# Patient Record
Sex: MALE | Race: BLACK OR AFRICAN AMERICAN | NOT HISPANIC OR LATINO | URBAN - METROPOLITAN AREA
[De-identification: names, ages, dates, MRNs, and addresses within clinical notes are randomized per-mention and may not be internally consistent; named-entity substitution may affect disease eponyms.]

---

## 2022-07-28 ENCOUNTER — PATIENT MESSAGE (OUTPATIENT)
Dept: ORTHOPEDICS | Facility: CLINIC | Age: 47
End: 2022-07-28
Payer: COMMERCIAL

## 2022-07-28 DIAGNOSIS — M25.561 RIGHT KNEE PAIN, UNSPECIFIED CHRONICITY: Primary | ICD-10-CM

## 2022-08-03 ENCOUNTER — OFFICE VISIT (OUTPATIENT)
Dept: SPORTS MEDICINE | Facility: CLINIC | Age: 47
End: 2022-08-03
Payer: COMMERCIAL

## 2022-08-03 ENCOUNTER — HOSPITAL ENCOUNTER (OUTPATIENT)
Dept: RADIOLOGY | Facility: HOSPITAL | Age: 47
Discharge: HOME | End: 2022-08-03
Attending: ORTHOPAEDIC SURGERY
Payer: COMMERCIAL

## 2022-08-03 ENCOUNTER — TELEPHONE (OUTPATIENT)
Dept: ORTHOPEDICS | Facility: CLINIC | Age: 47
End: 2022-08-03
Payer: COMMERCIAL

## 2022-08-03 DIAGNOSIS — M25.561 RIGHT KNEE PAIN, UNSPECIFIED CHRONICITY: ICD-10-CM

## 2022-08-03 DIAGNOSIS — M25.561 RIGHT KNEE PAIN, UNSPECIFIED CHRONICITY: Primary | ICD-10-CM

## 2022-08-03 PROCEDURE — 1159F MED LIST DOCD IN RCRD: CPT | Mod: CPTII,S$GLB,, | Performed by: FAMILY MEDICINE

## 2022-08-03 PROCEDURE — 73562 X-RAY EXAM OF KNEE 3: CPT | Mod: TC,LT

## 2022-08-03 PROCEDURE — 99999 PR PBB SHADOW E&M-EST. PATIENT-LVL II: ICD-10-PCS | Mod: PBBFAC,,, | Performed by: FAMILY MEDICINE

## 2022-08-03 PROCEDURE — 73564 X-RAY EXAM KNEE 4 OR MORE: CPT | Mod: 26,RT,, | Performed by: RADIOLOGY

## 2022-08-03 PROCEDURE — 99204 PR OFFICE/OUTPT VISIT, NEW, LEVL IV, 45-59 MIN: ICD-10-PCS | Mod: 25,S$GLB,, | Performed by: FAMILY MEDICINE

## 2022-08-03 PROCEDURE — 20610 LARGE JOINT ASPIRATION/INJECTION: R KNEE: ICD-10-PCS | Mod: RT,S$GLB,, | Performed by: FAMILY MEDICINE

## 2022-08-03 PROCEDURE — 99204 OFFICE O/P NEW MOD 45 MIN: CPT | Mod: 25,S$GLB,, | Performed by: FAMILY MEDICINE

## 2022-08-03 PROCEDURE — 73562 XR KNEE ORTHO RIGHT WITH FLEXION: ICD-10-PCS | Mod: 26,LT,, | Performed by: RADIOLOGY

## 2022-08-03 PROCEDURE — 73564 XR KNEE ORTHO RIGHT WITH FLEXION: ICD-10-PCS | Mod: 26,RT,, | Performed by: RADIOLOGY

## 2022-08-03 PROCEDURE — 73562 X-RAY EXAM OF KNEE 3: CPT | Mod: 26,LT,, | Performed by: RADIOLOGY

## 2022-08-03 PROCEDURE — 99999 PR PBB SHADOW E&M-EST. PATIENT-LVL II: CPT | Mod: PBBFAC,,, | Performed by: FAMILY MEDICINE

## 2022-08-03 PROCEDURE — 20610 DRAIN/INJ JOINT/BURSA W/O US: CPT | Mod: RT,S$GLB,, | Performed by: FAMILY MEDICINE

## 2022-08-03 PROCEDURE — 1159F PR MEDICATION LIST DOCUMENTED IN MEDICAL RECORD: ICD-10-PCS | Mod: CPTII,S$GLB,, | Performed by: FAMILY MEDICINE

## 2022-08-03 RX ORDER — BETAMETHASONE SODIUM PHOSPHATE AND BETAMETHASONE ACETATE 3; 3 MG/ML; MG/ML
6 INJECTION, SUSPENSION INTRA-ARTICULAR; INTRALESIONAL; INTRAMUSCULAR; SOFT TISSUE
Status: DISCONTINUED | OUTPATIENT
Start: 2022-08-03 | End: 2022-08-03 | Stop reason: HOSPADM

## 2022-08-03 RX ADMIN — BETAMETHASONE SODIUM PHOSPHATE AND BETAMETHASONE ACETATE 6 MG: 3; 3 INJECTION, SUSPENSION INTRA-ARTICULAR; INTRALESIONAL; INTRAMUSCULAR; SOFT TISSUE at 10:08

## 2022-08-03 NOTE — PATIENT INSTRUCTIONS
"Apply ice to affected area three times a day for (15) fifteen minutes for the next 48 hours, and reduce activity during that time.  Voltaren gel three times a day to affected area if recommended.  Oral medication if recommended.  Physical therapy if recommended at home or at clinic.  Keep recheck visit. Patient Education       Knee Pain   The Basics   Written by the doctors and editors at [facility]   What causes knee pain? -- Many different conditions can cause knee pain. Some of the most common are listed below.  Bending or using the knee too much - This can cause pain in the front of the knee that worsens with running, climbing steps, or sitting for a long time.  Arthritis - Arthritis is a general term that means inflammation of the joints. There are lots of types of arthritis. The most common type, called osteoarthritis, often comes with age. It can cause pain, stiffness, and swelling (figure 1).  Bursitis - Bursitis happens when fluid-filled sacs around the knee (called "bursae") get irritated or swollen (figure 2). Bursitis can cause pain and swelling.  A collection of fluid in the knee - This can happen after a knee injury.  A tear in the meniscus - The meniscus is a cushion of rubbery material (cartilage) between the thigh bone and the leg bone (figure 3).  A tear in a ligament - Ligaments are bands of tissue that connect one bone to another. There are 4 ligaments in each knee (figure 3).  Muscle strain - Different leg muscles move the knee joint, causing the knee to bend and straighten. If one of these muscles doesn't work well, moving the knee can cause pain.  Other knee injuries, a knee joint infection, or a condition called gout, which causes crystals to form inside joints.  Conditions that don't involve the knee - For example, problems in the hip can sometimes cause knee pain.  Is there anything I can do on my own to feel better? -- Yes. To ease your symptoms, you can:  Put ice on the knee to reduce pain " "and swelling - For the first few weeks after an injury, or after an activity that makes your pain worse, you can try icing your knee. Put a cold gel pack, bag of ice, or bag of frozen vegetables on the injured area every 1 to 2 hours, for 15 minutes each time. Put a thin towel between the ice (or other cold object) and your skin. To reduce swelling, sit or lie down and raise your leg above the level of your heart when you put ice on it.  Rest your knee and avoid movements that worsen the pain - Try not to squat, kneel, or run. Also, don't use exercise machines, such as stair steppers or rowing machines. Instead, you can walk or swim (the front and back crawl strokes) for exercise.  Take a pain-relieving medicine, such as acetaminophen (sample brand name: Tylenol) or ibuprofen (sample brand names: Advil, Motrin).  Should I see a doctor or nurse? -- See your doctor or nurse if:  You are unable to put weight on your knee, your knee "locks" in place, or your knee "gives out"  Your knee is very swollen and painful  You have a fever with knee pain, swelling, and redness  Your knee pain doesn't get better or gets worse after you treat it on your own for a few days  How is knee pain treated? -- The right treatment for knee pain depends on what is causing it. Treatments might include:  Wearing a knee brace or shoe insert  Doing exercises to strengthen and stretch the muscles that move the knee joint - Ask your doctor or nurse which exercises can help with the cause of your pain.  Having physical therapy  Getting a shot of medicine in the knee  Other medicines  Surgery  All topics are updated as new evidence becomes available and our peer review process is complete.  This topic retrieved from [employer] on: Sep 21, 2021.  Topic 97375 Version 11.0  Release: 29.4.2 - C29.263  © 2021 UpToDate, Inc. and/or its affiliates. All rights reserved.  figure 1: Knee osteoarthritis     This drawing shows a normal knee joint next to a knee " "joint with osteoarthritis (OA). In the OA joint, the cartilage covering the ends of the bones roughens and becomes thin, while the bone underneath the cartilage grows thicker. Bony growths called "osteophytes" can form. The space between the bones also becomes narrower.  Graphic 221219 Version 2.0    figure 2: Knee bursa (prepatellar bursa)     Graphic 01326 Version 3.0    figure 3: Front view of the knee     This drawing shows the inner parts of the knee as seen from the front. A small bone (called the patella or the "knee cap") that sits in front of the knee has been removed so that you can see what is under that bone. The anterior cruciate ligament (ACL) is in the middle in white. It connects the thigh bone (called the "femur") to the shin bone (called the "tibia"). The meniscus is a cushion of rubbery material (cartilage) between the thigh bone and the shin bone.  Graphic 17098 Version 5.0    Consumer Information Use and Disclaimer   This information is not specific medical advice and does not replace information you receive from your health care provider. This is only a brief summary of general information. It does NOT include all information about conditions, illnesses, injuries, tests, procedures, treatments, therapies, discharge instructions or life-style choices that may apply to you. You must talk with your health care provider for complete information about your health and treatment options. This information should not be used to decide whether or not to accept your health care provider's advice, instructions or recommendations. Only your health care provider has the knowledge and training to provide advice that is right for you. The use of this information is governed by the [employer] End User License Agreement, available at https://www.CreationFlow.ZeroPercent.us/en/solutions/[employer]/about/alonso.The use of [employer] content is governed by the [employer] Terms of Use. ©2021 [employer] All rights " reserved.  Copyright   © 2021 [employer] and/or its affiliates. All rights reserved.

## 2022-08-03 NOTE — PROCEDURES
Large Joint Aspiration/Injection: R knee    Date/Time: 8/3/2022 10:00 AM  Performed by: Amanuel Feldman MD  Authorized by: Amanuel Feldman MD     Indications:  Pain  Site marked: the procedure site was marked    Timeout: prior to procedure the correct patient, procedure, and site was verified    Prep: patient was prepped and draped in usual sterile fashion    Local anesthetic:  Bupivacaine 0.25% without epinephrine and lidocaine 1% without epinephrine  Approach:  Anterolateral  Location:  Knee  Site:  R knee  Medications:  6 mg betamethasone acetate-betamethasone sodium phosphate 6 mg/mL  Patient tolerance:  Patient tolerated the procedure well with no immediate complications

## 2022-08-03 NOTE — TELEPHONE ENCOUNTER
Called pt to r/s appointment due to provider being out of office. He agreed to same day/ same time visit with Dr. Amanuel Feldman.

## 2022-08-03 NOTE — PROGRESS NOTES
Subjective:     Patient ID: True Subramanian is a 47 y.o. male.    Chief Complaint: Pain of the Right Knee      HPI:  Patient states that he developed right knee pain over the last 3 months without a specific DILLON.  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  He also likes to play basketball and is unable to do this due to pain, weakness and instability in the overall knee.  He states that the pain is all over the knee and currently rates the pain as a 5/10 with the pain increasing to an 8/10 when active.  He is currently taking an anti inflammatory Duexis prescribed by Dr. Radha Boston when he had a previous achilles injury.  He has not tried physical therapy for this injury.    Experiences buckling but no locking.  Discontinue playing basketball the past few months and then tried 1 day last week and did okay until the 2nd day after play and that he had severe pain in the knee and increased swelling.    History reviewed. No pertinent past medical history.  Past Surgical History:   Procedure Laterality Date    ACHILLES TENDON SURGERY Right 2017     History reviewed. No pertinent family history.  Social History     Socioeconomic History    Marital status: Single   Tobacco Use    Smoking status: Passive Smoke Exposure - Never Smoker    Smokeless tobacco: Never Used   Substance and Sexual Activity    Alcohol use: Not Currently    Drug use: Never    Sexual activity: Yes     No current outpatient medications on file.  Review of patient's allergies indicates:  No Known Allergies  Review of Systems   Constitutional: Negative for chills, fever and weight loss.   Respiratory: Negative for shortness of breath.    Cardiovascular: Negative for chest pain and palpitations.       Objective:   There is no height or weight on file to calculate BMI.  There were no vitals filed for this visit.        Ortho/SPM Exam-alert and  "oriented well-nourished well-developed pleasant adult male ambulatory in no acute distress    Respiratory effort is normal    Right knee-no acute deformity    1+ diffuse anterior puffiness    Point tender distal quad medial aspect    Range of motion 5-90 degrees as compared to 0-110 on opposite side    Right ankle dorsiflexion is a few degrees less on the right compared to the left-has or both Achilles in the past    Knee joint is stable with negative Lachman's    Strength 4/5    Psychiatric good affect cognition    Neurovascular intact    Plan as cortisone injection to improve function and decrease pain.  Physical therapy for strengthening and range of motion.    Use ice and Duexis as needed and avoid basketball or climbing or stressful activities for now    Patient Instructions   Apply ice to affected area three times a day for (15) fifteen minutes for the next 48 hours, and reduce activity during that time.  Voltaren gel three times a day to affected area if recommended.  Oral medication if recommended.  Physical therapy if recommended at home or at clinic.  Keep recheck visit. Patient Education       Knee Pain   The Basics   Written by the doctors and editors at [facility]   What causes knee pain? -- Many different conditions can cause knee pain. Some of the most common are listed below.  · Bending or using the knee too much - This can cause pain in the front of the knee that worsens with running, climbing steps, or sitting for a long time.  · Arthritis - Arthritis is a general term that means inflammation of the joints. There are lots of types of arthritis. The most common type, called osteoarthritis, often comes with age. It can cause pain, stiffness, and swelling (figure 1).  · Bursitis - Bursitis happens when fluid-filled sacs around the knee (called "bursae") get irritated or swollen (figure 2). Bursitis can cause pain and swelling.  · A collection of fluid in the knee - This can happen after a knee " "injury.  · A tear in the meniscus - The meniscus is a cushion of rubbery material (cartilage) between the thigh bone and the leg bone (figure 3).  · A tear in a ligament - Ligaments are bands of tissue that connect one bone to another. There are 4 ligaments in each knee (figure 3).  · Muscle strain - Different leg muscles move the knee joint, causing the knee to bend and straighten. If one of these muscles doesn't work well, moving the knee can cause pain.  · Other knee injuries, a knee joint infection, or a condition called gout, which causes crystals to form inside joints.  · Conditions that don't involve the knee - For example, problems in the hip can sometimes cause knee pain.  Is there anything I can do on my own to feel better? -- Yes. To ease your symptoms, you can:  · Put ice on the knee to reduce pain and swelling - For the first few weeks after an injury, or after an activity that makes your pain worse, you can try icing your knee. Put a cold gel pack, bag of ice, or bag of frozen vegetables on the injured area every 1 to 2 hours, for 15 minutes each time. Put a thin towel between the ice (or other cold object) and your skin. To reduce swelling, sit or lie down and raise your leg above the level of your heart when you put ice on it.  · Rest your knee and avoid movements that worsen the pain - Try not to squat, kneel, or run. Also, don't use exercise machines, such as stair steppers or rowing machines. Instead, you can walk or swim (the front and back crawl strokes) for exercise.  · Take a pain-relieving medicine, such as acetaminophen (sample brand name: Tylenol) or ibuprofen (sample brand names: Advil, Motrin).  Should I see a doctor or nurse? -- See your doctor or nurse if:  · You are unable to put weight on your knee, your knee "locks" in place, or your knee "gives out"  · Your knee is very swollen and painful  · You have a fever with knee pain, swelling, and redness  · Your knee pain doesn't get better " "or gets worse after you treat it on your own for a few days  How is knee pain treated? -- The right treatment for knee pain depends on what is causing it. Treatments might include:  · Wearing a knee brace or shoe insert  · Doing exercises to strengthen and stretch the muscles that move the knee joint - Ask your doctor or nurse which exercises can help with the cause of your pain.  · Having physical therapy  · Getting a shot of medicine in the knee  · Other medicines  · Surgery  All topics are updated as new evidence becomes available and our peer review process is complete.  This topic retrieved from [employer] on: Sep 21, 2021.  Topic 99811 Version 11.0  Release: 29.4.2 - C29.263  © 2021 UpToDate, Inc. and/or its affiliates. All rights reserved.  figure 1: Knee osteoarthritis     This drawing shows a normal knee joint next to a knee joint with osteoarthritis (OA). In the OA joint, the cartilage covering the ends of the bones roughens and becomes thin, while the bone underneath the cartilage grows thicker. Bony growths called "osteophytes" can form. The space between the bones also becomes narrower.  Graphic 703768 Version 2.0    figure 2: Knee bursa (prepatellar bursa)     Graphic 26545 Version 3.0    figure 3: Front view of the knee     This drawing shows the inner parts of the knee as seen from the front. A small bone (called the patella or the "knee cap") that sits in front of the knee has been removed so that you can see what is under that bone. The anterior cruciate ligament (ACL) is in the middle in white. It connects the thigh bone (called the "femur") to the shin bone (called the "tibia"). The meniscus is a cushion of rubbery material (cartilage) between the thigh bone and the shin bone.  Graphic 89511 Version 5.0    Consumer Information Use and Disclaimer   This information is not specific medical advice and does not replace information you receive from your health care provider. This is only a brief summary " of general information. It does NOT include all information about conditions, illnesses, injuries, tests, procedures, treatments, therapies, discharge instructions or life-style choices that may apply to you. You must talk with your health care provider for complete information about your health and treatment options. This information should not be used to decide whether or not to accept your health care provider's advice, instructions or recommendations. Only your health care provider has the knowledge and training to provide advice that is right for you. The use of this information is governed by the [employer] End User License Agreement, available at https://www.[employer]iptr/en/solutions/[employer]/about/alonso.The use of [employer] content is governed by the [employer] Terms of Use. ©2021 UpToDate, Inc. All rights reserved.  Copyright   © 2021 UpToDate, Inc. and/or its affiliates. All rights reserved.        IMAGING: X-ray Knee Ortho Right with Flexion  Narrative: EXAMINATION:  XR KNEE ORTHO RIGHT WITH FLEXION    CLINICAL HISTORY:  Pain in right knee    TECHNIQUE:  AP standing as well as PA flexion standing and Merchant views of both knees were performed.  A lateral view of the right knee is also performed.    COMPARISON:  None.    FINDINGS:  No acute fracture or dislocation.  No significant joint space narrowing.  No patellar tilt.  Soft tissues are within normal limits  Impression: See above    Electronically signed by: Boone Saavedra MD  Date:    08/03/2022  Time:    10:46       Radiographs / Imaging : Relevant imaging results reviewed by me and interpreted by me, discussed with the patient and / or family -x-rays reviewed by me and agree with report and discussed in viewed detail with the patient today    Assessment:     Encounter Diagnosis   Name Primary?    Right knee pain, unspecified chronicity Yes        Plan:   Right knee pain, unspecified chronicity  -     Large Joint Aspiration/Injection: R  knee        The patient verbalized good understanding of the medical issues discussed today and expressed appreciation for the care provided.  Patient was given the opportunity to ask questions and be an active participant in their medical care. Patient had no further questions or concerns at this time.     The patient was encouraged to participate in appropriate physical activity.      Amanuel Feldman M.D.  [facility]        This note was dictated using voice recognition software and may have sound a like errors.

## 2022-09-21 ENCOUNTER — OFFICE VISIT (OUTPATIENT)
Dept: SPORTS MEDICINE | Facility: CLINIC | Age: 47
End: 2022-09-21
Payer: COMMERCIAL

## 2022-09-21 VITALS — WEIGHT: 245 LBS | BODY MASS INDEX: 37.13 KG/M2 | HEIGHT: 68 IN

## 2022-09-21 DIAGNOSIS — M25.561 RIGHT KNEE PAIN, UNSPECIFIED CHRONICITY: Primary | ICD-10-CM

## 2022-09-21 PROCEDURE — 99999 PR PBB SHADOW E&M-EST. PATIENT-LVL III: CPT | Mod: PBBFAC,,, | Performed by: FAMILY MEDICINE

## 2022-09-21 PROCEDURE — 1159F PR MEDICATION LIST DOCUMENTED IN MEDICAL RECORD: ICD-10-PCS | Mod: CPTII,S$GLB,, | Performed by: FAMILY MEDICINE

## 2022-09-21 PROCEDURE — 99214 OFFICE O/P EST MOD 30 MIN: CPT | Mod: S$GLB,,, | Performed by: FAMILY MEDICINE

## 2022-09-21 PROCEDURE — 3008F PR BODY MASS INDEX (BMI) DOCUMENTED: ICD-10-PCS | Mod: CPTII,S$GLB,, | Performed by: FAMILY MEDICINE

## 2022-09-21 PROCEDURE — 99214 PR OFFICE/OUTPT VISIT, EST, LEVL IV, 30-39 MIN: ICD-10-PCS | Mod: S$GLB,,, | Performed by: FAMILY MEDICINE

## 2022-09-21 PROCEDURE — 99999 PR PBB SHADOW E&M-EST. PATIENT-LVL III: ICD-10-PCS | Mod: PBBFAC,,, | Performed by: FAMILY MEDICINE

## 2022-09-21 PROCEDURE — 3008F BODY MASS INDEX DOCD: CPT | Mod: CPTII,S$GLB,, | Performed by: FAMILY MEDICINE

## 2022-09-21 PROCEDURE — 1159F MED LIST DOCD IN RCRD: CPT | Mod: CPTII,S$GLB,, | Performed by: FAMILY MEDICINE

## 2022-09-21 RX ORDER — IBUPROFEN 800 MG/1
TABLET ORAL
Qty: 60 TABLET | Refills: 2 | Status: SHIPPED | OUTPATIENT
Start: 2022-09-21

## 2022-09-21 NOTE — PROGRESS NOTES
Subjective:     Patient ID: True Subramanian is a 47 y.o. male.    Chief Complaint: Pain of the Right Knee      HPI: Patient is being seen for right knee follow up after receiving cortisone injection at his last office visit on 8/3/22. Says the injection lasted for about a month before the pain returned. Running triggers knee pain. Denies any pain during today's office visit. Takes Duexis that he received from Dr. Neelam Boston for a torn achilles as needed to help with pain relief. Is not in physical therapy, but would like to start going. Feels his right knee is weak.  Unable to job without fairly severe pain.    Wants Duexis refill.        History reviewed. No pertinent past medical history.  Past Surgical History:   Procedure Laterality Date    ACHILLES TENDON SURGERY Right 2017     History reviewed. No pertinent family history.  Social History     Socioeconomic History    Marital status: Single   Tobacco Use    Smoking status: Passive Smoke Exposure - Never Smoker    Smokeless tobacco: Never   Substance and Sexual Activity    Alcohol use: Not Currently    Drug use: Never    Sexual activity: Yes       Current Outpatient Medications:     ibuprofen (ADVIL,MOTRIN) 800 MG tablet, Take 1 tablet 2 to 3 times a day with food for knee pain as needed.  May want to add 20 mg over-the-counter Pepcid per day., Disp: 60 tablet, Rfl: 2  Review of patient's allergies indicates:  No Known Allergies  Review of Systems   Constitutional:  Negative for chills, fever and weight loss.   Respiratory:  Negative for shortness of breath.    Cardiovascular:  Negative for chest pain and palpitations.     Objective:   Body mass index is 37.25 kg/m².  There were no vitals filed for this visit.        Ortho/SPM Exam-alert and oriented well-nourished well-developed ambulatory no acute distress     Respiratory effort appears normal     Right knee-no acute deformity or swelling    Range of motion 0-100 degrees     Strength 4/5     Tender to  palpation to medial joint line and distal hamstring insertions on the medial aspect of the knee    Joint is stable with negative Lachman's     Neurovascular intact     Psychiatric good affect cognition     Plan-physical therapy important and will refer fell Duexis or 800 mg ibuprofen depending on insurance.    Recheck in 6-8 weeks.    There are no Patient Instructions on file for this visit.    IMAGING: X-ray Knee Ortho Right with Flexion  Narrative: EXAMINATION:  XR KNEE ORTHO RIGHT WITH FLEXION    CLINICAL HISTORY:  Pain in right knee    TECHNIQUE:  AP standing as well as PA flexion standing and Merchant views of both knees were performed.  A lateral view of the right knee is also performed.    COMPARISON:  None.    FINDINGS:  No acute fracture or dislocation.  No significant joint space narrowing.  No patellar tilt.  Soft tissues are within normal limits  Impression: See above    Electronically signed by: Boone Saavedra MD  Date:    08/03/2022  Time:    10:46       Radiographs / Imaging : Relevant imaging results reviewed by me and interpreted by me, discussed with the patient and / or family x-rays reviewed by me and agree with report      Assessment:     Encounter Diagnosis   Name Primary?    Right knee pain, unspecified chronicity Yes        Plan:   Right knee pain, unspecified chronicity    Other orders  -     ibuprofen (ADVIL,MOTRIN) 800 MG tablet; Take 1 tablet 2 to 3 times a day with food for knee pain as needed.  May want to add 20 mg over-the-counter Pepcid per day.  Dispense: 60 tablet; Refill: 2      The patient verbalized good understanding of the medical issues discussed today and expressed appreciation for the care provided.  Patient was given the opportunity to ask questions and be an active participant in their medical care. Patient had no further questions or concerns at this time.     The patient was encouraged to participate in appropriate physical activity.      Amanuel Feldman M.D.  [facility]        This note was dictated using voice recognition software and may have sound a like errors.

## 2022-09-28 ENCOUNTER — CLINICAL SUPPORT (OUTPATIENT)
Dept: REHABILITATION | Facility: HOSPITAL | Age: 47
End: 2022-09-28
Payer: COMMERCIAL

## 2022-09-28 DIAGNOSIS — M25.561 RIGHT KNEE PAIN, UNSPECIFIED CHRONICITY: ICD-10-CM

## 2022-09-28 PROCEDURE — 97161 PT EVAL LOW COMPLEX 20 MIN: CPT

## 2022-09-28 PROCEDURE — 97110 THERAPEUTIC EXERCISES: CPT

## 2022-09-28 NOTE — PLAN OF CARE
[facility]   Physical Therapy Initial Evaluation     Date: 9/28/2022     Name: True Subramanian  Clinic Number: [id number]    Therapy Diagnosis:   Encounter Diagnosis   Name Primary?    Right knee pain, unspecified chronicity      Physician: Amanuel Feldman MD   Physician Orders: PT Eval and Treat  Medical Diagnosis from Referral: right knee pain   Evaluation Date: 9/28/2022  Authorization Period Expiration: 12/31/22  Plan of Care Expiration: 11/29/2022    Progress Update: 10/29/2022    Visit # / Visits authorized: 1 / 1     FOTO: Visit #1 - Scored : 1 / 3     PRECAUTIONS: Standard Precautions     Time In: 0815  Time Out: 0900  Total Appointment Time (timed & untimed codes): 30 minutes    SUBJECTIVE     Date of onset:     History of current condition - True is a 47 y.o. male whom reports right knee follow up after receiving cortisone injection at his last office visit on 8/3/22. Says the injection lasted for about a month before the pain returned. Running triggers knee pain. Denies any pain during today's office visit. Takes Duexis that he received from Dr. Neelam Boston for a torn achilles as needed to help with pain relief. Is not in physical therapy, but would like to start going. Feels his right knee is weak.  Unable to job without fairly severe pain..     Imaging: X-RAY: No acute fracture or dislocation.  No significant joint space narrowing.  No patellar tilt.  Soft tissues are within normal limits    Prior Therapy: Yes  Social History: Pt lives with their family  Living Environment: APT  ADLs unable to complete: navigating stairs, ADLs such as cutting grass, and ambulation in community   Gym/Home Equipment: yes   Occupation: Pt is unknown   Prior Level of Function: Independent with all activities of daily living  Current Level of Function: 80% of prior level of function     Pain:  Current 3 /10, worst 7 /10, best 1 /10   Location: right knee   Description: Aching, Throbbing,  Grabbing, and Tight  Aggravating Factors: navigating stairs   Easing Factors: medication and rest     _______________________________________________________    Medical History:   No past medical history on file.    Surgical History:   True Subramanian  has a past surgical history that includes Achilles tendon surgery (Right, 2017).    Medications:   True has a current medication list which includes the following prescription(s): ibuprofen.    Allergies:   Review of patient's allergies indicates:  No Known Allergies     OBJECTIVE   (x = not tested due to pain and/or inability to obtain test position)    RANGE OF MOTION:      Hip   Range of Motion Right   Left   Goal   Hip Flexion (120) 120 120 120   Hip Abduction (45) Not tested Not tested  30   Hip Extension (20) Not tested  Not tested  15   Hip internal rotaiton  (45) 0 0 40   Hip external rotation  (45) 30 30 40    (*) pain and/or dysfunction    Knee AROM/PROM Right   Left   Goal   Hyper - Zero - Flexion      0-0-100 0-0-110 0-0-120  Initial:     (*) pain and/or dysfunction    Ankle/Foot   Range of Motion Right   Left   Goal   Dorsiflexion (20) 10 10 15   Plantarflexion (50) x x 40   Great Toe Extension (70) x x 60    (*) pain and/or dysfunction     JOINT MOBILITY:     Joint Motion Tested Right   Left    Goal   Patellar Glides: Superior Hypomobile Hypomobile Normal B   Patellar Glides: Inferior Hypomobile Hypomobile Normal B   Patellar Glides: Medical Hypomobile Hypomobile Normal B   Patellar Tilts Hypomobile Hypomobile Normal B   Talocural   Normal B   Subtalar   Normal B   Forefoot   Normal B   1st ray   Normal B       Sensation:  Sensation is intact to light touch    Palpation: Increased tone and tenderness noted with palpation to: lateral and medial aspect of right knee     Posture:  Pt presents with postural abnormalities which include: forward head, rounded shoulders, and ankle/foot pronation    Gait Analysis: The patient ambulated with the following  assistive device: none; the pt presents with the following gait abnormalities: decreased step length, loulou, and arm swing; increased forward flexed posture, trunk sway -     Movement Analysis:   Functional Test  Outcome   Double Leg Squat x   Single Leg Step Down Trendelenburg sign bilaterally        FUNCTION:     CMS Impairment/Limitation/Restriction for FOTO Knee Survey    Therapist reviewed FOTO scores for True Subramanian on 9/28/2022.   FOTO documents entered into [facility] - see Media section.    Limitation Score: %         TREATMENT   Total Treatment time separate from Evaluation: (20) minutes     True received the treatments listed below:      THERAPEUTIC EXERCISES: to develop strength, endurance, ROM, flexibility, posture and core stabilization for (10)  minutes including: x = performed today    TherEx Performed    Recumbent Bike  next Home exercise program review all below   Standing/sitting calf raises      Wall Squats            BOLD = new this visit  Plan for Next Visit: address hip mobility and glute med strength        PATIENT EDUCATION AND HOME EXERCISES     Education/Self-Care provided:  (included in treatment) minutes   Patient educated on the impairments noted above and the effects of physical therapy intervention to improve overall condition and QOL.   Patient was educated on all the above exercise prior/during/after for proper posture, positioning, and execution for safe performance with home exercise program.     Written Home Exercises Provided: yes. Prefers: Printed Copies  Exercises were reviewed and True was able to demonstrate them prior to the end of the session.  True demonstrated good understanding of the education provided.   TKEs   Sitting calf raises   Hip abd with band   See electronic medical record under Patient Instructions for exercises provided during therapy sessions.    ASSESSMENT     True is a 47 y.o. male referred to outpatient Physical Therapy with a medical diagnosis  "of patella femoral pain syndrome. True presents with clinical signs and symptoms that support this diagnosis with decreased knee and hip range of motion , decreased hip girdle, quad, and hamstring Strength, patellar joint hypomobility, increased joint and soft tissue edema, and impaired functional mobility.    The above impairments will be addressed through manual therapy techniques, therapeutic exercises, functional training, and modalities as necessary. Patient was treated and educated on exercises for home program, progression of therapy, and benefits of therapy to achieve full functional mobility. Patient will benefit from skilled physical therapy to meet short and long term goals and return to prior level of function.    Pt prognosis is Excellent.   Pt will benefit from skilled outpatient Physical Therapy to address the deficits stated above and in the chart below, provide pt/family education, and to maximize pt's level of independence.     Plan of care discussed with patient: Yes  Pt's spiritual, cultural and educational needs considered and patient is agreeable to the plan of care and goals as stated below:     Anticipated Barriers for therapy: chronicity of condition and occupation    Medical Necessity is demonstrated by the following:   History  Co-morbidities and personal factors that may impact the plan of care Co-morbidities:   No past medical history on file.    Personal Factors:   no deficits     low   Examination  Body Structures and Functions, activity limitations and participation restrictions that may impact the plan of care Body Regions:   lower extremities    Body Systems:    gross symmetry  ROM  strength  gross coordinated movement  balance  gait  transfers  transitions  motor control  motor learning    Participation Restrictions:   See above in "Current Level of Function"     Activity limitations:   Learning and applying knowledge  no deficits    General Tasks and Commands  no " deficits    Communication  no deficits    Mobility  lifting and carrying objects  walking  moving around using equipment (WC)  using transportation (bus, train, plane, car)  driving (bike, car, motorcycle)    Self care  looking after one's health    Domestic Life  shopping  cooking  doing house work (cleaning house, washing dishes, laundry)  assisting others    Interactions/Relationships  no deficits    Life Areas  no deficits    Community and Social Life  community life  recreation and leisure         low   Clinical Presentation stable and uncomplicated low   Decision Making/ Complexity Score: low       GOALS:  SHORT TERM GOALS: 4 weeks, (10/29/22) Progress   Recent signs and systems trend is improving in order to progress towards long term goals.    Patient will be independent with Home Exercise Program  in order to further progress and return to maximal function.    Pain rating at Worst: 5/10 in order to progress towards increased independence with activity.    Patient will be able to correct postural deviations in sitting and standing, to decrease pain and promote postural awareness for injury prevention.       LONG TERM GOALS: 8 weeks, (11/29/22) Progress   Patient will return to normal activities of daily living, recreational, and work related activities with less pain and limitation.     Patient will improve active range of motion to stated goals in order to return to maximal functional potential.     Patient will improve Strength to stated goals of appropriate musculature in order to improve functional independence.     Pain Rating at Best: 1/10 to improve Quality of Life.     Patient will meet predicted functional outcome (FOTO) score: % to increase self-worth & perceived functional ability.    Patient will have met/partially met personal goal of: being able to navigate 30 steps with no pain           PLAN   Plan of care Certification: 9/28/2022 to 11/29/2022    Outpatient Physical Therapy 2 times weekly  for 8 weeks to include any combination of the following interventions: virtual visits, dry needling, modalities, electrical stimulation (IFC, Pre-Mod, Attended with Functional Dry Needling), Gait Training, Manual Therapy, Moist Heat/ Ice, Neuromuscular Re-ed, Patient Education, Self Care, Therapeutic Exercise, Functional Training, and Therapeutic Activites     Thank you for this referral.    Cesar Jay, PT DPT      I CERTIFY THE NEED FOR THESE SERVICES FURNISHED UNDER THIS PLAN OF TREATMENT AND WHILE UNDER MY CARE   Physician's comments:     Physician's Signature: ___________________________________________________

## 2022-10-06 ENCOUNTER — CLINICAL SUPPORT (OUTPATIENT)
Dept: REHABILITATION | Facility: HOSPITAL | Age: 47
End: 2022-10-06
Payer: COMMERCIAL

## 2022-10-06 DIAGNOSIS — M25.561 RIGHT KNEE PAIN, UNSPECIFIED CHRONICITY: Primary | ICD-10-CM

## 2022-10-06 PROCEDURE — 97110 THERAPEUTIC EXERCISES: CPT | Mod: CQ

## 2022-10-06 PROCEDURE — 97112 NEUROMUSCULAR REEDUCATION: CPT | Mod: CQ

## 2022-10-06 NOTE — PROGRESS NOTES
[facility]   Physical Therapy Treatment Note     Name: True Subramanian  Clinic Number: [id number]    Therapy Diagnosis:   Encounter Diagnosis   Name Primary?    Right knee pain, unspecified chronicity Yes     Physician: Amanuel Feldman MD    Visit Date: 10/6/2022    Physician: Amanuel Feldman MD    Physician Orders: PT Eval and Treat  Medical Diagnosis from Referral: right knee pain   Evaluation Date: 9/28/2022  Authorization Period Expiration: 12/31/2022  Plan of Care Expiration: 11/29/2022                          Progress Update: 10/29/2022                        Visit # / Visits authorized: 1 / 20 (+ eval)                   FOTO: Visit #1 - Scored : 1 / 3      PRECAUTIONS: Standard Precautions        PTA Visit #: 1/5     Time In: 1045  Time Out: 1130  Total Billable Time: 40 minutes    SUBJECTIVE     Pt reports: he is not attempting running or playing basketball at this time. He does not have pain otherwise, but would like to get back to playing ball.   He was compliant with home exercise program.  Response to previous treatment: good  Functional change: none noted    Pain: 0/10  Location: right knee     OBJECTIVE     Objective Measures updated at progress report unless specified.     Treatment     True received the treatments listed below:      therapeutic exercises to develop strength, endurance, ROM, flexibility, posture, and core stabilization for 25 minutes including:  Upright bike for endurance 7 minutes level 5  Single leg calf raises 3x10 each bilateral  Standing calf stretch on step 4x30s holds  Double leg press 7 plates 3x10  Single leg press 5 plates 3x10 each bilateral    neuromuscular re-education activities to improve: Balance, Coordination, Kinesthetic, Sense, Proprioception, and Posture for 15 minutes. The following activities were included:  Heel taps 6 inch 3x10 each bilateral  Monster walks red 3x10 forwards/backwards  Side stepping red 3x10         Patient  Education and Home Exercises     Home Exercises Provided and Patient Education Provided     Education provided:     Written Home Exercises Provided: Patient instructed to cont prior HEP. Exercises were reviewed and True was able to demonstrate them prior to the end of the session.  True demonstrated good  understanding of the education provided. See EMR under Patient Instructions for exercises provided during therapy sessions    ASSESSMENT     Patient did well with session today with minimal complaints of discomfort. Patient challenged with gastroc stretch, improved with performance. Decreased quad strength noted in right side, patient challenged with heel taps. Patient left session with good fatigue along with hip and quad soreness.     True Is progressing well towards his goals.   Pt prognosis is Excellent.     Pt will continue to benefit from skilled outpatient physical therapy to address the deficits listed in the problem list box on initial evaluation, provide pt/family education and to maximize pt's level of independence in the home and community environment.     Pt's spiritual, cultural and educational needs considered and pt agreeable to plan of care and goals.     Anticipated barriers to physical therapy:chronicity of condition and occupation       Goals:   SHORT TERM GOALS: 4 weeks, (10/29/22) Progress   Recent signs and systems trend is improving in order to progress towards long term goals.     Patient will be independent with Home Exercise Program  in order to further progress and return to maximal function.     Pain rating at Worst: 5/10 in order to progress towards increased independence with activity.     Patient will be able to correct postural deviations in sitting and standing, to decrease pain and promote postural awareness for injury prevention.         LONG TERM GOALS: 8 weeks, (11/29/22) Progress   Patient will return to normal activities of daily living, recreational, and work related  activities with less pain and limitation.      Patient will improve active range of motion to stated goals in order to return to maximal functional potential.      Patient will improve Strength to stated goals of appropriate musculature in order to improve functional independence.      Pain Rating at Best: 1/10 to improve Quality of Life.      Patient will meet predicted functional outcome (FOTO) score: % to increase self-worth & perceived functional ability.     Patient will have met/partially met personal goal of: being able to navigate 30 steps with no pain           PLAN   Plan of care Certification: 9/28/2022 to 11/29/2022    Continue Plan of Care (POC) and progress per patient tolerance.     Nasima Saucedo, PTA

## 2022-10-07 ENCOUNTER — CLINICAL SUPPORT (OUTPATIENT)
Dept: REHABILITATION | Facility: HOSPITAL | Age: 47
End: 2022-10-07
Payer: COMMERCIAL

## 2022-10-07 DIAGNOSIS — M25.561 RIGHT KNEE PAIN, UNSPECIFIED CHRONICITY: Primary | ICD-10-CM

## 2022-10-07 PROCEDURE — 97110 THERAPEUTIC EXERCISES: CPT | Mod: CQ

## 2022-10-07 PROCEDURE — 97112 NEUROMUSCULAR REEDUCATION: CPT | Mod: CQ

## 2022-10-07 NOTE — PROGRESS NOTES
[facility]   Physical Therapy Treatment Note     Name: True Subramanian  Clinic Number: [id number]    Therapy Diagnosis:   Encounter Diagnosis   Name Primary?    Right knee pain, unspecified chronicity Yes       Physician: Amanuel Feldman MD    Visit Date: 10/7/2022    Physician: Amanuel Feldman MD    Physician Orders: PT Eval and Treat  Medical Diagnosis from Referral: right knee pain   Evaluation Date: 9/28/2022  Authorization Period Expiration: 12/31/2022  Plan of Care Expiration: 11/29/2022                          Progress Update: 10/29/2022                        Visit # / Visits authorized: 2 / 20 (+ eval)                   FOTO: Visit #1 - Scored : 1 / 3      PRECAUTIONS: Standard Precautions        PTA Visit #: 2/5     Time In: 0900  Time Out: 0945  Total Billable Time: 45 minutes    SUBJECTIVE     Pt reports: feeling fine after yesterday's appointment. Only running and cutting trigger pain for him.   He was compliant with home exercise program.  Response to previous treatment: good  Functional change: none noted    Pain: 0/10  Location: right knee     OBJECTIVE     Objective Measures updated at progress report unless specified.     Treatment     True received the treatments listed below:      therapeutic exercises to develop strength, endurance, ROM, flexibility, posture, and core stabilization for 15 minutes including:  Upright bike for endurance 7 minutes level 5  Single leg calf raises 3x10 each bilateral  Standing calf stretch on step 3x30s holds  Standing soleus stretch on step 3x30s holds  Double leg press 7 plates 3x10  Single leg press 5 plates 3x10 each bilateral    neuromuscular re-education activities to improve: Balance, Coordination, Kinesthetic, Sense, Proprioception, and Posture for 30minutes. The following activities were included:  Single Leg RDLs cone taps 3x7 each bilateral cone on 6inch step  Single leg balance ball toss 3x20 throws at  trampoline  Bridge with hamstring curl on theraball 20x  Box taps 20 inch 3x10  Single leg box taps 24 inch 3x10 each bilateral    Heel taps 6 inch 3x10 each bilateral  Monster walks red 3x10 forwards/backwards  Side stepping red 3x10         Patient Education and Home Exercises     Home Exercises Provided and Patient Education Provided     Education provided:     Written Home Exercises Provided: Patient instructed to cont prior HEP. Exercises were reviewed and True was able to demonstrate them prior to the end of the session.  True demonstrated good  understanding of the education provided. See EMR under Patient Instructions for exercises provided during therapy sessions    ASSESSMENT     Patient did well with session today with minimal complaints of discomfort. Very challenged with balance activities, needing one upper extremity assist for successful performance of RDLs. Some slight discomfort with squat activities today, verbal cues for keeping heel contact. Patient with limitations due to achilles tightness on right side, improved with stretching. Patient left session with soreness.     True Is progressing well towards his goals.   Pt prognosis is Excellent.     Pt will continue to benefit from skilled outpatient physical therapy to address the deficits listed in the problem list box on initial evaluation, provide pt/family education and to maximize pt's level of independence in the home and community environment.     Pt's spiritual, cultural and educational needs considered and pt agreeable to plan of care and goals.     Anticipated barriers to physical therapy:chronicity of condition and occupation       Goals:   SHORT TERM GOALS: 4 weeks, (10/29/22) Progress   Recent signs and systems trend is improving in order to progress towards long term goals.     Patient will be independent with Home Exercise Program  in order to further progress and return to maximal function.     Pain rating at Worst: 5/10 in  order to progress towards increased independence with activity.     Patient will be able to correct postural deviations in sitting and standing, to decrease pain and promote postural awareness for injury prevention.         LONG TERM GOALS: 8 weeks, (11/29/22) Progress   Patient will return to normal activities of daily living, recreational, and work related activities with less pain and limitation.      Patient will improve active range of motion to stated goals in order to return to maximal functional potential.      Patient will improve Strength to stated goals of appropriate musculature in order to improve functional independence.      Pain Rating at Best: 1/10 to improve Quality of Life.      Patient will meet predicted functional outcome (FOTO) score: % to increase self-worth & perceived functional ability.     Patient will have met/partially met personal goal of: being able to navigate 30 steps with no pain           PLAN   Plan of care Certification: 9/28/2022 to 11/29/2022    Continue Plan of Care (POC) and progress per patient tolerance.     Nasima Saucedo, PTA

## 2022-10-14 ENCOUNTER — CLINICAL SUPPORT (OUTPATIENT)
Dept: REHABILITATION | Facility: HOSPITAL | Age: 47
End: 2022-10-14
Payer: COMMERCIAL

## 2022-10-14 DIAGNOSIS — M25.561 CHRONIC PAIN OF RIGHT KNEE: Primary | ICD-10-CM

## 2022-10-14 DIAGNOSIS — M25.60 DECREASED RANGE OF MOTION WITH DECREASED STRENGTH: ICD-10-CM

## 2022-10-14 DIAGNOSIS — R53.1 DECREASED RANGE OF MOTION WITH DECREASED STRENGTH: ICD-10-CM

## 2022-10-14 DIAGNOSIS — G89.29 CHRONIC PAIN OF RIGHT KNEE: Primary | ICD-10-CM

## 2022-10-14 PROCEDURE — 97112 NEUROMUSCULAR REEDUCATION: CPT

## 2022-10-14 PROCEDURE — 97110 THERAPEUTIC EXERCISES: CPT

## 2022-10-14 NOTE — PROGRESS NOTES
[facility]   Physical Therapy Treatment Note     Name: True Subramanian  Clinic Number: [id number]    Therapy Diagnosis:   Encounter Diagnoses   Name Primary?    Decreased range of motion with decreased strength     Chronic pain of right knee Yes     Physician: Amanuel Feldman MD  Visit Date: 10/14/2022  Physician Orders: PT Eval and Treat  Medical Diagnosis from Referral: right knee pain   Evaluation Date: 9/28/2022  Authorization Period Expiration: 12/31/2022  Plan of Care Expiration: 11/29/2022                          Progress Update: 10/29/2022                        Visit # / Visits authorized: 3 / 20 (+ eval)                   FOTO: Visit #1 - Scored : 1 / 3      PRECAUTIONS: Standard Precautions        PTA Visit #: 0/5     Time In: 1045  Time Out: 1130  Total Billable Time: 45 minutes    SUBJECTIVE     Pt reports: feeling fine after yesterday's appointment. Only running and cutting trigger pain for him.     He was compliant with home exercise program.  Response to previous treatment: good  Functional change: none noted    Pain: 0/10  Location: right knee     OBJECTIVE     Objective Measures updated at progress report unless specified.     Treatment     True received the treatments listed below:    (BOLD= Exercises performed)   therapeutic exercises to develop strength, endurance, ROM, flexibility, posture, and core stabilization for 15 minutes including:    Upright bike for endurance 6 minutes level 5  Single leg calf raises 3x10 each bilateral  Eccentric calf raises 2x20 (B) DL  Standing calf stretch on step 3x30s holds  Standing soleus stretch on step 3x30s holds  Double leg press 7 plates 3x10  Single leg press 5 plates 3x10 each bilateral    neuromuscular re-education activities to improve: Balance, Coordination, Kinesthetic, Sense, Proprioception, and Posture for 30 minutes. The following activities were included:    Single Leg RDLs cone taps 3x8 each bilateral cone on  6inch step  Single leg balance ball toss 3x20 throws at tramPT PALine  Bridge with hamstring curl on theraball 20x  Box taps 20 inch 3x10  Single leg box taps 24 inch 3x10 each bilateral  Calf stretch  Isometric lunge holds    Heel taps 6 inch 3x10 each bilateral  Monster walks red 3x10 forwards/backwards  Side stepping red 3x10         Patient Education and Home Exercises     Home Exercises Provided and Patient Education Provided     Education provided:     Written Home Exercises Provided: Patient instructed to cont prior HEP. Exercises were reviewed and True was able to demonstrate them prior to the end of the session.  True demonstrated good  understanding of the education provided. See EMR under Patient Instructions for exercises provided during therapy sessions    ASSESSMENT     True Subramanian tolerated PT session well with minimal  complaints of pain or discomfort, secondary to poor motor control and decrease muscle endurance. Objective findings are improving with measurements of ROM and functional mobility.  Therapy exercises were reviewed by revisiting exercises given from previous home exercise program while adding more ankle mobility drills.  Handouts were not issued during today's visit. True demonstrated good understanding of new exercises and will continue to progress at home until next follow-up.       True Is progressing well towards his goals.   Pt prognosis is Excellent.     Pt will continue to benefit from skilled outpatient physical therapy to address the deficits listed in the problem list box on initial evaluation, provide pt/family education and to maximize pt's level of independence in the home and community environment.     Pt's spiritual, cultural and educational needs considered and pt agreeable to plan of care and goals.     Anticipated barriers to physical therapy:chronicity of condition and occupation       Goals:   SHORT TERM GOALS: 4 weeks, (10/29/22) Progress   Recent signs and  systems trend is improving in order to progress towards long term goals.     Patient will be independent with Home Exercise Program  in order to further progress and return to maximal function.     Pain rating at Worst: 5/10 in order to progress towards increased independence with activity.     Patient will be able to correct postural deviations in sitting and standing, to decrease pain and promote postural awareness for injury prevention.         LONG TERM GOALS: 8 weeks, (11/29/22) Progress   Patient will return to normal activities of daily living, recreational, and work related activities with less pain and limitation.      Patient will improve active range of motion to stated goals in order to return to maximal functional potential.      Patient will improve Strength to stated goals of appropriate musculature in order to improve functional independence.      Pain Rating at Best: 1/10 to improve Quality of Life.      Patient will meet predicted functional outcome (FOTO) score: % to increase self-worth & perceived functional ability.     Patient will have met/partially met personal goal of: being able to navigate 30 steps with no pain           PLAN   Plan of care Certification: 9/28/2022 to 11/29/2022    Continue Plan of Care (POC) and progress per patient tolerance.     Cesar Jay, PT DPT

## 2022-10-26 ENCOUNTER — CLINICAL SUPPORT (OUTPATIENT)
Dept: REHABILITATION | Facility: HOSPITAL | Age: 47
End: 2022-10-26
Payer: COMMERCIAL

## 2022-10-26 DIAGNOSIS — R53.1 DECREASED RANGE OF MOTION WITH DECREASED STRENGTH: Primary | ICD-10-CM

## 2022-10-26 DIAGNOSIS — M25.60 DECREASED RANGE OF MOTION WITH DECREASED STRENGTH: Primary | ICD-10-CM

## 2022-10-26 DIAGNOSIS — M25.561 RIGHT KNEE PAIN, UNSPECIFIED CHRONICITY: ICD-10-CM

## 2022-10-26 PROCEDURE — 97110 THERAPEUTIC EXERCISES: CPT

## 2022-10-26 PROCEDURE — 97112 NEUROMUSCULAR REEDUCATION: CPT

## 2022-10-26 NOTE — PROGRESS NOTES
[facility]   Physical Therapy Treatment Note     Name: True Subramanian  Clinic Number: [id number]    Therapy Diagnosis:   Encounter Diagnoses   Name Primary?    Decreased range of motion with decreased strength Yes    Right knee pain, unspecified chronicity      Physician: Amanuel Feldman MD  Visit Date: 10/26/2022  Physician Orders: PT Eval and Treat  Medical Diagnosis from Referral: right knee pain   Evaluation Date: 9/28/2022  Authorization Period Expiration: 12/31/2022  Plan of Care Expiration: 11/29/2022                          Progress Update: 10/29/2022                        Visit # / Visits authorized: 4 / 20 (+ eval)                   FOTO: Visit #1 - Scored : 1 / 3      PRECAUTIONS: Standard Precautions        PTA Visit #: 0/5     Time In: 0815  Time Out: 0900  Total Billable Time: 45 minutes    SUBJECTIVE     Pt reports: improvement with sitting/standing tolerance. Still having some difficulty with navigating stairs.     He was compliant with home exercise program.  Response to previous treatment: good  Functional change: none noted    Pain: 0/10  Location: right knee     OBJECTIVE     Objective Measures updated at progress report unless specified.     Treatment     True received the treatments listed below:    (BOLD= Exercises performed)     therapeutic exercises to develop strength, endurance, ROM, flexibility, posture, and core stabilization for 25 minutes including:    Upright bike for endurance 6 minutes level 5  Single leg calf raises 3x10 each bilateral  Eccentric calf raises 2x20 (B) DL  Standing calf stretch on step 3x30s holds  Standing soleus stretch on step 3x30s holds  Double leg press 7 plates 3x10  Single leg press 5 plates 3x10 each bilateral    neuromuscular re-education activities to improve: Balance, Coordination, Kinesthetic, Sense, Proprioception, and Posture for 20 minutes. The following activities were included:    Single Leg RDLs cone taps 3x8 each  bilateral cone on 6inch step  Single leg balance ball toss 3x20 throws at trampoline  Bridge with hamstring curl on theraball 20x  Box taps 20 inch 3x10  Single leg box taps 24 inch 3x10 each bilateral  Calf stretch  Isometric lunge holds  Heel taps 6 inch 3x10 each bilateral  Monster walks red 3x10 forwards/backwards  Side stepping red 3x10         Patient Education and Home Exercises     Home Exercises Provided and Patient Education Provided     Education provided:     Written Home Exercises Provided: Patient instructed to cont prior HEP. Exercises were reviewed and True was able to demonstrate them prior to the end of the session.  True demonstrated good  understanding of the education provided. See EMR under Patient Instructions for exercises provided during therapy sessions    ASSESSMENT     True Subramanian tolerated PT session well with minimal complaints of pain or discomfort, secondary to poor motor control and decrease muscle endurance. Objective findings are improving with measurements of ROM and functional mobility.Therapy exercises were reviewed by revisiting exercises given from previous home exercise program while adding more ankle mobility drills.  Handouts were not issued during today's visit. True demonstrated good understanding of new exercises and will continue to progress at home until next follow-up.       True Is progressing well towards his goals.   Pt prognosis is Excellent.     Pt will continue to benefit from skilled outpatient physical therapy to address the deficits listed in the problem list box on initial evaluation, provide pt/family education and to maximize pt's level of independence in the home and community environment.     Pt's spiritual, cultural and educational needs considered and pt agreeable to plan of care and goals.     Anticipated barriers to physical therapy:chronicity of condition and occupation       Goals:   SHORT TERM GOALS: 4 weeks, (10/29/22) Progress    Recent signs and systems trend is improving in order to progress towards long term goals.     Patient will be independent with Home Exercise Program  in order to further progress and return to maximal function.     Pain rating at Worst: 5/10 in order to progress towards increased independence with activity.     Patient will be able to correct postural deviations in sitting and standing, to decrease pain and promote postural awareness for injury prevention.         LONG TERM GOALS: 8 weeks, (11/29/22) Progress   Patient will return to normal activities of daily living, recreational, and work related activities with less pain and limitation.      Patient will improve active range of motion to stated goals in order to return to maximal functional potential.      Patient will improve Strength to stated goals of appropriate musculature in order to improve functional independence.      Pain Rating at Best: 1/10 to improve Quality of Life.      Patient will meet predicted functional outcome (FOTO) score: % to increase self-worth & perceived functional ability.     Patient will have met/partially met personal goal of: being able to navigate 30 steps with no pain           PLAN   Plan of care Certification: 9/28/2022 to 11/29/2022    Continue Plan of Care (POC) and progress per patient tolerance.     Cesar Jay, PT DPT

## 2022-10-28 ENCOUNTER — CLINICAL SUPPORT (OUTPATIENT)
Dept: REHABILITATION | Facility: HOSPITAL | Age: 47
End: 2022-10-28
Payer: COMMERCIAL

## 2022-10-28 DIAGNOSIS — M25.561 RIGHT KNEE PAIN, UNSPECIFIED CHRONICITY: ICD-10-CM

## 2022-10-28 DIAGNOSIS — R53.1 DECREASED RANGE OF MOTION WITH DECREASED STRENGTH: Primary | ICD-10-CM

## 2022-10-28 DIAGNOSIS — M25.60 DECREASED RANGE OF MOTION WITH DECREASED STRENGTH: Primary | ICD-10-CM

## 2022-10-28 PROCEDURE — 97112 NEUROMUSCULAR REEDUCATION: CPT

## 2022-10-28 PROCEDURE — 97110 THERAPEUTIC EXERCISES: CPT

## 2022-10-31 NOTE — PROGRESS NOTES
[facility]   Physical Therapy Treatment Note     Name: True Subramanian  Clinic Number: [id number]    Therapy Diagnosis:   Encounter Diagnoses   Name Primary?    Decreased range of motion with decreased strength Yes    Right knee pain, unspecified chronicity      Physician: Amanuel Feldman MD  Visit Date: 10/28/2022  Physician Orders: PT Eval and Treat  Medical Diagnosis from Referral: right knee pain   Evaluation Date: 9/28/2022  Authorization Period Expiration: 12/31/2022  Plan of Care Expiration: 11/29/2022                          Progress Update: 10/29/2022                        Visit # / Visits authorized: 5 / 20 (+ eval)                   FOTO: Visit #1 - Scored : 1 / 3      PRECAUTIONS: Standard Precautions        PTA Visit #: 0/5     Time In: 0915  Time Out: 1000  Total Billable Time: 45 minutes    SUBJECTIVE     Pt reports: improvement with sitting/standing tolerance. Still having some difficulty with navigating stairs.     He was compliant with home exercise program.  Response to previous treatment: good  Functional change: none noted    Pain: 0/10  Location: right knee     OBJECTIVE     Objective Measures updated at progress report unless specified.     Treatment     True received the treatments listed below:    (BOLD= Exercises performed)     therapeutic exercises to develop strength, endurance, ROM, flexibility, posture, and core stabilization for 25 minutes including:    Upright bike for endurance 6 minutes level 5  Single leg calf raises 3x10 each bilateral  Eccentric calf raises 2x20 (B) DL  Standing calf stretch on step 3x30s holds  Standing soleus stretch on step 3x30s holds  Double leg press 7 plates 3x10  Single leg press 5 plates 3x10 each bilateral    neuromuscular re-education activities to improve: Balance, Coordination, Kinesthetic, Sense, Proprioception, and Posture for 20 minutes. The following activities were included:    Single Leg RDLs cone taps 3x8 each  bilateral cone on 6inch step  Single leg balance ball toss 3x20 throws at trampoline  Bridge with hamstring curl on theraball 20x  Box taps 20 inch 3x10  Single leg box taps 24 inch 3x10 each bilateral  Calf stretch  Isometric lunge holds  Heel taps 6 inch 3x10 each bilateral  Monster walks red 3x10 forwards/backwards  Side stepping red 3x10         Patient Education and Home Exercises     Home Exercises Provided and Patient Education Provided     Education provided:     Written Home Exercises Provided: Patient instructed to cont prior HEP. Exercises were reviewed and True was able to demonstrate them prior to the end of the session.  True demonstrated good  understanding of the education provided. See EMR under Patient Instructions for exercises provided during therapy sessions    ASSESSMENT     True Subramanian tolerated PT session well with minimal complaints of pain or discomfort, secondary to poor motor control and decrease muscle endurance. Objective findings are improving with measurements of ROM and functional mobility.Therapy exercises were reviewed by revisiting exercises given from previous home exercise program while adding more ankle mobility drills.  Handouts were not issued during today's visit. True demonstrated good understanding of new exercises and will continue to progress at home until next follow-up.       True Is progressing well towards his goals.   Pt prognosis is Excellent.     Pt will continue to benefit from skilled outpatient physical therapy to address the deficits listed in the problem list box on initial evaluation, provide pt/family education and to maximize pt's level of independence in the home and community environment.     Pt's spiritual, cultural and educational needs considered and pt agreeable to plan of care and goals.     Anticipated barriers to physical therapy:chronicity of condition and occupation       Goals:   SHORT TERM GOALS: 4 weeks, (10/29/22) Progress    Recent signs and systems trend is improving in order to progress towards long term goals.     Patient will be independent with Home Exercise Program  in order to further progress and return to maximal function.     Pain rating at Worst: 5/10 in order to progress towards increased independence with activity.     Patient will be able to correct postural deviations in sitting and standing, to decrease pain and promote postural awareness for injury prevention.         LONG TERM GOALS: 8 weeks, (11/29/22) Progress   Patient will return to normal activities of daily living, recreational, and work related activities with less pain and limitation.      Patient will improve active range of motion to stated goals in order to return to maximal functional potential.      Patient will improve Strength to stated goals of appropriate musculature in order to improve functional independence.      Pain Rating at Best: 1/10 to improve Quality of Life.      Patient will meet predicted functional outcome (FOTO) score: % to increase self-worth & perceived functional ability.     Patient will have met/partially met personal goal of: being able to navigate 30 steps with no pain           PLAN   Plan of care Certification: 9/28/2022 to 11/29/2022    Continue Plan of Care (POC) and progress per patient tolerance.     Cesar Jay, PT DPT

## 2022-11-02 ENCOUNTER — CLINICAL SUPPORT (OUTPATIENT)
Dept: REHABILITATION | Facility: HOSPITAL | Age: 47
End: 2022-11-02
Payer: COMMERCIAL

## 2022-11-02 DIAGNOSIS — R53.1 DECREASED RANGE OF MOTION WITH DECREASED STRENGTH: Primary | ICD-10-CM

## 2022-11-02 DIAGNOSIS — M25.561 ACUTE PAIN OF RIGHT KNEE: ICD-10-CM

## 2022-11-02 DIAGNOSIS — M25.60 DECREASED RANGE OF MOTION WITH DECREASED STRENGTH: Primary | ICD-10-CM

## 2022-11-02 PROCEDURE — 97112 NEUROMUSCULAR REEDUCATION: CPT

## 2022-11-02 PROCEDURE — 97110 THERAPEUTIC EXERCISES: CPT

## 2022-11-03 NOTE — PROGRESS NOTES
[facility]   Physical Therapy Treatment Note     Name: True Subramanian  Clinic Number: [id number]    Therapy Diagnosis:   Encounter Diagnoses   Name Primary?    Decreased range of motion with decreased strength Yes    Acute pain of right knee      Physician: Amanuel Feldman MD  Visit Date: 11/2/2022  Physician Orders: PT Eval and Treat  Medical Diagnosis from Referral: right knee pain   Evaluation Date: 9/28/2022  Authorization Period Expiration: 12/31/2022  Plan of Care Expiration: 11/29/2022                          Progress Update: 10/29/2022                        Visit # / Visits authorized: 6 / 20 (+ eval)                   FOTO: Visit #1 - Scored : 1 / 3      PRECAUTIONS: Standard Precautions        PTA Visit #: 0/5     Time In: 1715  Time Out: 1800  Total Billable Time: 45 minutes    SUBJECTIVE     Pt reports: improvement with sitting/standing tolerance. Still having some difficulty with navigating stairs.     He was compliant with home exercise program.  Response to previous treatment: good  Functional change: none noted    Pain: 0/10  Location: right knee     OBJECTIVE     Objective Measures updated at progress report unless specified.     Treatment     True received the treatments listed below:    (BOLD= Exercises performed)     therapeutic exercises to develop strength, endurance, ROM, flexibility, posture, and core stabilization for 25 minutes including:    Upright bike for endurance 6 minutes level 5  Single leg calf raises 3x10 each bilateral  Eccentric calf raises 2x20 (B) DL  Standing calf stretch on step 3x30s holds  Standing soleus stretch on step 3x30s holds  Double leg press 5 plates 3x10  Single leg press 5 plates 3x10 each bilateral    neuromuscular re-education activities to improve: Balance, Coordination, Kinesthetic, Sense, Proprioception, and Posture for 20 minutes. The following activities were included:    Single Leg RDLs cone taps 3x8 each bilateral cone  on 6inch step  Single leg balance ball toss 3x20 throws at trampoline  Bridge with hamstring curl on theraball 20x  Box taps 20 inch 3x10  Single leg box taps 24 inch 3x10 each bilateral  Calf stretch  Isometric lunge holds  Heel taps 6 inch 3x10 each bilateral  Monster walks red 3x10 forwards/backwards  Side stepping red 3x10         Patient Education and Home Exercises     Home Exercises Provided and Patient Education Provided     Education provided:     Written Home Exercises Provided: Patient instructed to cont prior HEP. Exercises were reviewed and True was able to demonstrate them prior to the end of the session.  True demonstrated good  understanding of the education provided. See EMR under Patient Instructions for exercises provided during therapy sessions    ASSESSMENT     True Subramanian tolerated PT session well with minimal complaints of pain or discomfort, secondary to poor motor control and decrease muscle endurance. Objective findings are improving with measurements of ROM and functional mobility.Therapy exercises were reviewed by revisiting exercises given from previous home exercise program while adding more ankle mobility drills.  Handouts were not issued during today's visit. True demonstrated good understanding of new exercises and will continue to progress at home until next follow-up.       True Is progressing well towards his goals.   Pt prognosis is Excellent.     Pt will continue to benefit from skilled outpatient physical therapy to address the deficits listed in the problem list box on initial evaluation, provide pt/family education and to maximize pt's level of independence in the home and community environment.     Pt's spiritual, cultural and educational needs considered and pt agreeable to plan of care and goals.     Anticipated barriers to physical therapy:chronicity of condition and occupation       Goals:   SHORT TERM GOALS: 4 weeks, (10/29/22) Progress   Recent signs and  systems trend is improving in order to progress towards long term goals.     Patient will be independent with Home Exercise Program  in order to further progress and return to maximal function.     Pain rating at Worst: 5/10 in order to progress towards increased independence with activity.     Patient will be able to correct postural deviations in sitting and standing, to decrease pain and promote postural awareness for injury prevention.         LONG TERM GOALS: 8 weeks, (11/29/22) Progress   Patient will return to normal activities of daily living, recreational, and work related activities with less pain and limitation.      Patient will improve active range of motion to stated goals in order to return to maximal functional potential.      Patient will improve Strength to stated goals of appropriate musculature in order to improve functional independence.      Pain Rating at Best: 1/10 to improve Quality of Life.      Patient will meet predicted functional outcome (FOTO) score: % to increase self-worth & perceived functional ability.     Patient will have met/partially met personal goal of: being able to navigate 30 steps with no pain           PLAN   Plan of care Certification: 9/28/2022 to 11/29/2022    Continue Plan of Care (POC) and progress per patient tolerance.     Cesar Jay, PT DPT